# Patient Record
Sex: FEMALE | Race: WHITE | NOT HISPANIC OR LATINO | Employment: FULL TIME | URBAN - METROPOLITAN AREA
[De-identification: names, ages, dates, MRNs, and addresses within clinical notes are randomized per-mention and may not be internally consistent; named-entity substitution may affect disease eponyms.]

---

## 2017-01-01 DIAGNOSIS — F43.89 REACTION TO CHRONIC STRESS: ICD-10-CM

## 2017-01-01 RX ORDER — ALPRAZOLAM 0.25 MG/1
TABLET ORAL
Qty: 10 TABLET | Refills: 0 | Status: CANCELLED | OUTPATIENT
Start: 2017-01-01

## 2017-01-03 ENCOUNTER — OFFICE VISIT (OUTPATIENT)
Dept: INTERNAL MEDICINE | Facility: CLINIC | Age: 57
End: 2017-01-03

## 2017-01-03 VITALS
HEIGHT: 68 IN | WEIGHT: 241.2 LBS | DIASTOLIC BLOOD PRESSURE: 78 MMHG | SYSTOLIC BLOOD PRESSURE: 120 MMHG | HEART RATE: 79 BPM | OXYGEN SATURATION: 98 % | BODY MASS INDEX: 36.56 KG/M2

## 2017-01-03 DIAGNOSIS — R00.1 BRADYCARDIA: ICD-10-CM

## 2017-01-03 DIAGNOSIS — E78.2 MIXED HYPERLIPIDEMIA: ICD-10-CM

## 2017-01-03 DIAGNOSIS — E03.9 ACQUIRED HYPOTHYROIDISM: ICD-10-CM

## 2017-01-03 DIAGNOSIS — Z00.00 ANNUAL PHYSICAL EXAM: Primary | ICD-10-CM

## 2017-01-03 DIAGNOSIS — F43.89 REACTION TO CHRONIC STRESS: ICD-10-CM

## 2017-01-03 DIAGNOSIS — E55.9 VITAMIN D DEFICIENCY: ICD-10-CM

## 2017-01-03 DIAGNOSIS — Z11.59 SCREENING FOR VIRAL DISEASE: ICD-10-CM

## 2017-01-03 DIAGNOSIS — N39.0 URINARY TRACT INFECTION, SITE UNSPECIFIED: ICD-10-CM

## 2017-01-03 LAB
ALBUMIN SERPL-MCNC: 4.5 G/DL (ref 3.2–4.8)
ALBUMIN/GLOB SERPL: 1.6 G/DL (ref 1.5–2.5)
ALP SERPL-CCNC: 56 U/L (ref 25–100)
ALT SERPL W P-5'-P-CCNC: 36 U/L (ref 7–40)
ANION GAP SERPL CALCULATED.3IONS-SCNC: 10 MMOL/L (ref 3–11)
ARTICHOKE IGE QN: 101 MG/DL (ref 0–130)
AST SERPL-CCNC: 23 U/L (ref 0–33)
BILIRUB BLD-MCNC: NEGATIVE MG/DL
BILIRUB SERPL-MCNC: 0.6 MG/DL (ref 0.3–1.2)
BUN BLD-MCNC: 12 MG/DL (ref 9–23)
BUN/CREAT SERPL: 15 (ref 7–25)
CALCIUM SPEC-SCNC: 9.7 MG/DL (ref 8.7–10.4)
CHLORIDE SERPL-SCNC: 102 MMOL/L (ref 99–109)
CHOLEST SERPL-MCNC: 190 MG/DL (ref 0–200)
CLARITY, POC: CLEAR
CO2 SERPL-SCNC: 26 MMOL/L (ref 20–31)
COLOR UR: YELLOW
CREAT BLD-MCNC: 0.8 MG/DL (ref 0.6–1.3)
GFR SERPL CREATININE-BSD FRML MDRD: 74 ML/MIN/1.73
GLOBULIN UR ELPH-MCNC: 2.9 GM/DL
GLUCOSE BLD-MCNC: 99 MG/DL (ref 70–100)
GLUCOSE UR STRIP-MCNC: NEGATIVE MG/DL
HCV AB SER DONR QL: NORMAL
HDLC SERPL-MCNC: 67 MG/DL (ref 40–60)
KETONES UR QL: NEGATIVE
LEUKOCYTE EST, POC: ABNORMAL
NITRITE UR-MCNC: NEGATIVE MG/ML
PH UR: 5 [PH] (ref 5–8)
POTASSIUM BLD-SCNC: 4.2 MMOL/L (ref 3.5–5.5)
PROT SERPL-MCNC: 7.4 G/DL (ref 5.7–8.2)
PROT UR STRIP-MCNC: NEGATIVE MG/DL
RBC # UR STRIP: ABNORMAL /UL
SODIUM BLD-SCNC: 138 MMOL/L (ref 132–146)
SP GR UR: 1.03 (ref 1–1.03)
TRIGL SERPL-MCNC: 134 MG/DL (ref 0–150)
UROBILINOGEN UR QL: NORMAL

## 2017-01-03 PROCEDURE — 86803 HEPATITIS C AB TEST: CPT | Performed by: INTERNAL MEDICINE

## 2017-01-03 PROCEDURE — 80061 LIPID PANEL: CPT | Performed by: INTERNAL MEDICINE

## 2017-01-03 PROCEDURE — 99396 PREV VISIT EST AGE 40-64: CPT | Performed by: INTERNAL MEDICINE

## 2017-01-03 PROCEDURE — 99212 OFFICE O/P EST SF 10 MIN: CPT | Performed by: INTERNAL MEDICINE

## 2017-01-03 PROCEDURE — 81003 URINALYSIS AUTO W/O SCOPE: CPT | Performed by: INTERNAL MEDICINE

## 2017-01-03 PROCEDURE — 87086 URINE CULTURE/COLONY COUNT: CPT | Performed by: INTERNAL MEDICINE

## 2017-01-03 PROCEDURE — 80053 COMPREHEN METABOLIC PANEL: CPT | Performed by: INTERNAL MEDICINE

## 2017-01-03 RX ORDER — ALPRAZOLAM 0.25 MG/1
0.25 TABLET ORAL NIGHTLY PRN
Qty: 90 TABLET | Refills: 1 | Status: SHIPPED | OUTPATIENT
Start: 2017-01-03

## 2017-01-03 RX ORDER — SIMVASTATIN 20 MG
20 TABLET ORAL NIGHTLY
Qty: 90 TABLET | Refills: 1 | Status: SHIPPED | OUTPATIENT
Start: 2017-01-03 | End: 2017-07-20 | Stop reason: SDUPTHER

## 2017-01-03 NOTE — PROGRESS NOTES
Chief Complaint   Patient presents with   • Annual Exam     fasting labs   • Stress   • Hyperlipidemia       History of Present Illness  HM, Adult Female: The patient is being seen for a health maintenance, gynecology and Dr. Weiss - had pap last month . The last health maintenance visit was >1 year(s) ago.   Social History: Household members include spouse. She is . Work status: working full time. The patient has never smoked cigarettes. She reports occasional alcohol use. She has never used illicit drugs.   General Health: The patient's health is described as good. She has regular dental visits. She denies vision problems. She denies hearing loss. Immunizations status: up to date.   Lifestyle:. She consumes a diverse and healthy diet. She does not have any weight concerns. She exercises regularly. She does not use tobacco. She denies alcohol use. She denies drug use.   Reproductive health: the patient is postmenopausal .    Screening: Cancer screening reviewed and current.   Metabolic screening reviewed and current.   Risk screening reviewed and current.   Doing well, had a quiet New Year. Was on vacation for the last week, and today is her first day back.  Stress level is very high, declines any other meds.    Review of Systems   Constitutional: Negative.  Negative for chills and fever.   HENT: Negative for ear discharge, ear pain, sinus pressure and sore throat.    Respiratory: Negative for cough, chest tightness and shortness of breath.    Cardiovascular: Negative for chest pain, palpitations and leg swelling.   Gastrointestinal: Negative for diarrhea, nausea and vomiting.   Musculoskeletal: Negative for arthralgias, back pain and myalgias.   Neurological: Negative for dizziness, syncope and headaches.   Psychiatric/Behavioral: Negative for confusion and sleep disturbance. The patient is nervous/anxious (increased stress).        Patient Active Problem List   Diagnosis   • Elevated blood pressure   •  "Hyperlipidemia   • Hypothyroidism   • Reaction to chronic stress   • Urinary tract infection   • Microscopic hematuria   • Allergic rhinitis   • Anxiety   • Depression   • Postmenopausal atrophic vaginitis   • Vitamin D deficiency       Social History     Social History   • Marital status:      Spouse name: N/A   • Number of children: N/A   • Years of education: N/A     Occupational History   • Not on file.     Social History Main Topics   • Smoking status: Never Smoker   • Smokeless tobacco: Never Used   • Alcohol use Yes   • Drug use: No   • Sexual activity: Not on file     Other Topics Concern   • Not on file     Social History Narrative       Current Outpatient Prescriptions on File Prior to Visit   Medication Sig Dispense Refill   • clidinium-chlordiazepoxide (LIBRAX) 5-2.5 MG per capsule Take 1 capsule by mouth every 6 (six) hours as needed. For cramps and abdominal pain     • docusate sodium (STOOL SOFTENER) 250 MG capsule Take 1 capsule by mouth daily.     • levothyroxine (SYNTHROID, LEVOTHROID) 50 MCG tablet Take 1 tablet by mouth Daily. 90 tablet 0   • VIVELLE-DOT 0.05 MG/24HR patch      • [DISCONTINUED] ALPRAZolam (XANAX) 0.25 MG tablet TAKE ONE TABLET BY MOUTH EVERY NIGHT AT BEDTIME AS NEEDED FOR ANXIETY 10 tablet 0   • [DISCONTINUED] simvastatin (ZOCOR) 20 MG tablet TAKE ONE TABLET BY MOUTH EVERY NIGHT AT BEDTIME 90 tablet 0   • docusate sodium (STOOL SOFTENER) 250 MG capsule Take 1 capsule by mouth Daily. 90 capsule 0     No current facility-administered medications on file prior to visit.        Allergies   Allergen Reactions   • Other        Visit Vitals   • /78   • Pulse 79   • Ht 68\" (172.7 cm)   • Wt 241 lb 3.2 oz (109 kg)   • SpO2 98%  Comment: ra   • BMI 36.67 kg/m2              The following portions of the patient's history were reviewed and updated as appropriate: allergies, current medications, past family history, past medical history, past social history, past surgical history " and problem list.    Physical Exam   Constitutional: She is oriented to person, place, and time. She appears well-developed and well-nourished.   HENT:   Head: Normocephalic and atraumatic.   Right Ear: External ear normal.   Left Ear: External ear normal.   Mouth/Throat: No oropharyngeal exudate.   Eyes: Conjunctivae are normal. Pupils are equal, round, and reactive to light.   Neck: Neck supple. No thyromegaly present.   Cardiovascular: Normal rate, regular rhythm and intact distal pulses.  Exam reveals no gallop and no friction rub.    No murmur heard.  Pulmonary/Chest: Effort normal and breath sounds normal. No respiratory distress. She has no wheezes. She has no rales.   Abdominal: Soft. Bowel sounds are normal. She exhibits no distension. There is no tenderness.   Musculoskeletal: She exhibits no edema.   Neurological: She is alert and oriented to person, place, and time. She displays normal reflexes. No cranial nerve deficit. She exhibits normal muscle tone. Coordination normal.   Skin: Skin is warm and dry.   Psychiatric: She has a normal mood and affect. Judgment normal.   Nursing note and vitals reviewed.      Results for orders placed or performed in visit on 01/03/17   Comprehensive Metabolic Panel   Result Value Ref Range    Glucose 99 70 - 100 mg/dL    BUN 12 9 - 23 mg/dL    Creatinine 0.80 0.60 - 1.30 mg/dL    Sodium 138 132 - 146 mmol/L    Potassium 4.2 3.5 - 5.5 mmol/L    Chloride 102 99 - 109 mmol/L    CO2 26.0 20.0 - 31.0 mmol/L    Calcium 9.7 8.7 - 10.4 mg/dL    Total Protein 7.4 5.7 - 8.2 g/dL    Albumin 4.50 3.20 - 4.80 g/dL    ALT (SGPT) 36 7 - 40 U/L    AST (SGOT) 23 0 - 33 U/L    Alkaline Phosphatase 56 25 - 100 U/L    Total Bilirubin 0.6 0.3 - 1.2 mg/dL    eGFR Non African Amer 74 >60 mL/min/1.73    Globulin 2.9 gm/dL    A/G Ratio 1.6 1.5 - 2.5 g/dL    BUN/Creatinine Ratio 15.0 7.0 - 25.0    Anion Gap 10.0 3.0 - 11.0 mmol/L   Lipid Panel   Result Value Ref Range    Total Cholesterol 190 0 -  200 mg/dL    Triglycerides 134 0 - 150 mg/dL    HDL Cholesterol 67 (H) 40 - 60 mg/dL    LDL Cholesterol  101 0 - 130 mg/dL   Hepatitis C Antibody   Result Value Ref Range    Hepatitis C Ab Non-Reactive Non-Reactive   POC Urinalysis Dipstick, Automated   Result Value Ref Range    Color Yellow Yellow, Straw, Dark Yellow, Emelia    Clarity, UA Clear Clear    Glucose, UA Negative Negative, 1000 mg/dL (3+) mg/dL    Bilirubin Negative Negative    Ketones, UA Negative Negative    Specific Gravity  1.030 1.005 - 1.030    Blood, UA 50 Rohit/ul (A) Negative    pH, Urine 5.0 5.0 - 8.0    Protein, POC Negative Negative mg/dL    Urobilinogen, UA Normal Normal    Leukocytes 500 Lisset/ul (A) Negative    Nitrite, UA Negative Negative       Ernestine was seen today for annual exam, stress and hyperlipidemia.    Diagnoses and all orders for this visit:    Annual physical exam    Bradycardia  -     Holter Monitor - 48 Hour  -     POC Urinalysis Dipstick, Automated    Mixed hyperlipidemia  -     Comprehensive Metabolic Panel  -     Lipid Panel  -     simvastatin (ZOCOR) 20 MG tablet; Take 1 tablet by mouth Every Night.    Acquired hypothyroidism    Vitamin D deficiency    Screening for viral disease  -     Hepatitis C Antibody    Urinary tract infection, site unspecified  -     Urine Culture (Clean Catch)    Reaction to chronic stress  -     ALPRAZolam (XANAX) 0.25 MG tablet; Take 1 tablet by mouth At Night As Needed for anxiety.          Health Maintenance   Topic Date Due   • MAMMOGRAM  10/12/2017   • LIPID PANEL  01/03/2018   • PAP SMEAR  11/21/2019   • COLONOSCOPY  12/22/2025   • TDAP/TD VACCINES (2 - Td) 01/03/2027   • HEPATITIS C SCREENING  Addressed   • INFLUENZA VACCINE  Addressed       Discussion/Summary  Impression: health maintenance visit. Currently, she eats an adequate diet and has an adequate exercise regimen.   Cervical cancer screening:Pap smear is current.last month, per Dr. Weiss.   Breast cancer screening: mammogram is due,  plans to get it soon.   Colorectal cancer screening: colonoscopy is current, due 2025.  Osteoporosis screening: Bone mineral density test is UTD- per Dr. Weiss.   Screening lab work includes hemoglobin, glucose, lipid profile, thyroid function testing, 25-hydroxyvitamin D and urinalysis.   Immunizations are needed, declines flu shot.     The patient has read and signed the [facility] Controlled Substance Contract.  I will continue to see patient for regular follow up appointments.  They are well controlled on their medication.  JOHANNY has been reviewed by me and is updated every 3 months. The patient is aware of the potential for addiction and dependence.    Return in about 6 months (around 7/3/2017) for Next scheduled follow up, please schedule end of June..

## 2017-01-03 NOTE — MR AVS SNAPSHOT
Ernestine Rodgers   1/3/2017 9:15 AM   Office Visit    Dept Phone:  [phone]   Encounter #:  [id number]    Provider:  Franci Vasquez MD   Department:  [facility] INTERNAL MEDICINE AND ENDOCRINOLOGY [place]                Your Full Care Plan              Today's Medication Changes          These changes are accurate as of: 1/3/17 11:15 AM.  If you have any questions, ask your nurse or doctor.               Medication(s)that have changed:     ALPRAZolam 0.25 MG tablet   Commonly known as:  XANAX   Take 1 tablet by mouth At Night As Needed for anxiety.   What changed:  See the new instructions.       simvastatin 20 MG tablet   Commonly known as:  ZOCOR   Take 1 tablet by mouth Every Night.   What changed:  See the new instructions.            Where to Get Your Medications      These medications were sent to [facility] [address] [place] [address] [address] [facility] - [phone] [facility] [phone]   [address] [facility] [address]     Phone:  [phone]     simvastatin 20 MG tablet         You can get these medications from any pharmacy     Bring a paper prescription for each of these medications     ALPRAZolam 0.25 MG tablet                  Your Updated Medication List          This list is accurate as of: 1/3/17 11:15 AM.  Always use your most recent med list.                ALPRAZolam 0.25 MG tablet   Commonly known as:  XANAX   Take 1 tablet by mouth At Night As Needed for anxiety.       clidinium-chlordiazePOXIDE 5-2.5 MG per capsule   Commonly known as:  LIBRAX       levothyroxine 50 MCG tablet   Commonly known as:  SYNTHROID, LEVOTHROID   Take 1 tablet by mouth Daily.       simvastatin 20 MG tablet   Commonly known as:  ZOCOR   Take 1 tablet by mouth Every Night.       * STOOL SOFTENER 250 MG capsule   Generic drug:  docusate sodium       * docusate sodium 250 MG capsule   Commonly known as:  STOOL SOFTENER   Take 1 capsule by  mouth Daily.       VIVELLE-DOT 0.05 MG/24HR patch   Generic drug:  estradiol       * Notice:  This list has 2 medication(s) that are the same as other medications prescribed for you. Read the directions carefully, and ask your doctor or other care provider to review them with you.            We Performed the Following     Comprehensive Metabolic Panel     Hepatitis C Antibody     Holter Monitor - 48 Hour     Lipid Panel     POC Urinalysis Dipstick, Automated     Urine Culture (Clean Catch)       You Were Diagnosed With        Codes Comments    Annual physical exam    -  Primary ICD-10-CM: Z00.00  ICD-9-CM: V70.0     Bradycardia     ICD-10-CM: R00.1  ICD-9-CM: 427.89     Mixed hyperlipidemia     ICD-10-CM: E78.2  ICD-9-CM: 272.2     Acquired hypothyroidism     ICD-10-CM: E03.9  ICD-9-CM: 244.9     Vitamin D deficiency     ICD-10-CM: E55.9  ICD-9-CM: 268.9     Screening for viral disease     ICD-10-CM: Z11.59  ICD-9-CM: V73.99     Urinary tract infection, site unspecified     ICD-10-CM: N39.0     Reaction to chronic stress     ICD-10-CM: F43.8  ICD-9-CM: 309.9       Instructions     None    Patient Instructions History      Upcoming Appointments     Visit Type Date Time Department    PHYSICAL 1/3/2017  9:15 AM [facility] [employer]    FOLLOW UP 6/27/2017 11:30 AM [facility] [place] Signup     Our records indicate that you have an active [employer] account.    You can view your After Visit Summary by going to [facility] and logging in with your [employer] username and password.  If you don't have a [employer] username and password but a parent or guardian has access to your record, the parent or guardian should login with their own [employer] username and password and access your record to view the After Visit Summary.    If you have questions, you can email [email or web address] or call [phone] to talk to our [employer] staff.  Remember, [employer] is NOT to be used for urgent needs.  For  "medical emergencies, dial 911.               Other Info from Your Visit           Your Appointments     Jun 27, 2017 11:30 AM EDT   Follow Up with Franci Vasquez MD   [facility] [place] (--)    [address] [address] [phone]           Arrive 15 minutes prior to appointment.              Allergies     Other        Reason for Visit     Annual Exam fasting labs    Stress     Hyperlipidemia           Vital Signs     Blood Pressure Pulse Height Weight Oxygen Saturation Body Mass Index    120/78 79 68\" (172.7 cm) 241 lb 3.2 oz (109 kg) 98% 36.67 kg/m2    Smoking Status                   Never Smoker           Problems and Diagnoses Noted     High cholesterol or triglycerides    Underactive thyroid    Reaction to chronic stress    Urinary tract infection    Vitamin D deficiency    Annual physical exam    -  Primary    Bradycardia        Screening for viral disease          Results     Holter Monitor - 48 Hour           POC Urinalysis Dipstick, Automated      Component Value Standard Range & Units    Color Yellow Yellow, Straw, Dark Yellow, Emelia    Clarity, UA Clear Clear    Glucose, UA Negative Negative, 1000 mg/dL (3+) mg/dL    Bilirubin Negative Negative    Ketones, UA Negative Negative    Specific Gravity  1.030 1.005 - 1.030    Blood, UA 50 Rohit/ul Negative    pH, Urine 5.0 5.0 - 8.0    Protein, POC Negative Negative mg/dL    Urobilinogen, UA Normal Normal    Leukocytes 500 Lisset/ul Negative    Nitrite, UA Negative Negative                    "

## 2017-01-05 ENCOUNTER — PATIENT MESSAGE (OUTPATIENT)
Dept: INTERNAL MEDICINE | Facility: CLINIC | Age: 57
End: 2017-01-05

## 2017-01-05 LAB — BACTERIA SPEC AEROBE CULT: NORMAL

## 2017-01-06 DIAGNOSIS — F43.89 REACTION TO CHRONIC STRESS: ICD-10-CM

## 2017-01-06 RX ORDER — ALPRAZOLAM 0.25 MG/1
TABLET ORAL
Qty: 10 TABLET | Refills: 0 | OUTPATIENT
Start: 2017-01-06

## 2017-01-09 RX ORDER — CIPROFLOXACIN 500 MG/1
500 TABLET, FILM COATED ORAL 2 TIMES DAILY
Qty: 14 TABLET | Refills: 0 | Status: SHIPPED | OUTPATIENT
Start: 2017-01-09

## 2017-01-09 NOTE — TELEPHONE ENCOUNTER
Jayla Pleitez MA 1/9/2017 8:50 AM EST        ----- Message -----   From: Ernestine Rodgers   Sent: 1/7/2017 7:01 PM   To: Luis Mckee [facility]  Subject: IT'S GOTTEN WORSE really uncomfortable - [place]*    Hi again.     So it's gotten worse. It feels like I have to go every minute not 30 minutes. Was 30 minutes yesterday but since I sent you the last message which is probably less than a few minutes ago, I have had to go to the bathroom with nothing coming out of course three times. In fact, I am on the toilet as I type this to you. Something is going on and it is quite uncomfortable.  ----- Message -----  From: Franci Vasquez MD  Sent: 1/6/2017 2:50 PM EST  To: Ernestine Rodgers  Subject: RE: Test Results Question    Hi Ernestine,    That was in the urine dipstick that we do in office. It detected white cells, , hence the culture was sent.   But it looks like that may be normal skin cells, and not an infection , since culture just grew normal skin bacteria.    Franci    ----- Message -----   From: Ernestine Rodgers   Sent: 1/5/2017 4:02 PM EST   To: Franci Vasquez MD  Subject: RE: Test Results Question    Hi.     Curious about the high white blood cells that you were seeing it was that an old report?    Thank you so much. You are the best.     Carlene  ----- Message -----  From: Franci Vasquez MD  Sent: 1/5/2017 1:37 PM EST  To: Ernestine Rodgers  Subject: RE: Test Results Question    Urine culture is normal.So no need to treat.   All your other labs look good also.  I will send you a copy of the report.  Thank for checking on it.  Happy New Year !    Franci    ----- Message -----   From: Ernestine Rodgers   Sent: 1/5/2017 7:29 AM EST   To: Franci Vasquez MD  Subject: Test Results Question    Any information about the Urine culture? White blood cells. UTI. How we should treat?

## 2017-03-07 RX ORDER — CHLORDIAZEPOXIDE HYDROCHLORIDE AND CLIDINIUM BROMIDE 5; 2.5 MG/1; MG/1
CAPSULE ORAL
Qty: 30 CAPSULE | Refills: 3 | Status: SHIPPED | OUTPATIENT
Start: 2017-03-07

## 2017-03-16 RX ORDER — LEVOTHYROXINE SODIUM 0.05 MG/1
TABLET ORAL
Qty: 90 TABLET | Refills: 1 | Status: SHIPPED | OUTPATIENT
Start: 2017-03-16 | End: 2017-10-04 | Stop reason: SDUPTHER

## 2017-03-21 ENCOUNTER — TELEPHONE (OUTPATIENT)
Dept: INTERNAL MEDICINE | Facility: CLINIC | Age: 57
End: 2017-03-21

## 2017-03-21 ENCOUNTER — OFFICE VISIT (OUTPATIENT)
Dept: INTERNAL MEDICINE | Facility: CLINIC | Age: 57
End: 2017-03-21

## 2017-03-21 VITALS
SYSTOLIC BLOOD PRESSURE: 136 MMHG | WEIGHT: 231 LBS | OXYGEN SATURATION: 99 % | BODY MASS INDEX: 35.01 KG/M2 | HEIGHT: 68 IN | DIASTOLIC BLOOD PRESSURE: 82 MMHG | HEART RATE: 73 BPM

## 2017-03-21 DIAGNOSIS — M25.552 LEFT HIP PAIN: Primary | ICD-10-CM

## 2017-03-21 DIAGNOSIS — R10.84 GENERALIZED ABDOMINAL DISCOMFORT: ICD-10-CM

## 2017-03-21 LAB
BILIRUB BLD-MCNC: NEGATIVE MG/DL
CLARITY, POC: CLEAR
COLOR UR: YELLOW
GLUCOSE UR STRIP-MCNC: NEGATIVE MG/DL
KETONES UR QL: NEGATIVE
LEUKOCYTE EST, POC: NEGATIVE
NITRITE UR-MCNC: NEGATIVE MG/ML
PH UR: 6 [PH] (ref 5–8)
PROT UR STRIP-MCNC: NEGATIVE MG/DL
RBC # UR STRIP: NEGATIVE /UL
SP GR UR: 1.03 (ref 1–1.03)
UROBILINOGEN UR QL: NORMAL

## 2017-03-21 PROCEDURE — 81003 URINALYSIS AUTO W/O SCOPE: CPT | Performed by: INTERNAL MEDICINE

## 2017-03-21 PROCEDURE — 86255 FLUORESCENT ANTIBODY SCREEN: CPT | Performed by: INTERNAL MEDICINE

## 2017-03-21 PROCEDURE — 99213 OFFICE O/P EST LOW 20 MIN: CPT | Performed by: INTERNAL MEDICINE

## 2017-03-21 NOTE — TELEPHONE ENCOUNTER
Ok for patient to come in a leave a urine sample or does she need an Appointment?    ----- Message from Henny Garcia sent at 3/21/2017 10:15 AM EDT -----  Contact: PATIENT  PATIENT IS CALLING WANTING TO BE SEEN TODAY FOR UTI CONCERNS. SHE IS WANTING TO COME IN A LEAVE A SAMPLE TO SEE IF SHE DOES HAVE A UTI. SHE IS HAVING PAIN IN LOWER BACK SIDE AND STOMACH IS BLOATED AT NIGHT. THE PAIN HAS BEEN EXCRUCIATING AND HAS A HISTORY OF KIDNEY INFECTIONS. SHE WANTS US TO CALL HER BACK ABOUT LEAVING A URINE SAMPLE TO BE TESTED FOR UTI OR KIDNEY INFECTION. YOU CAN REACH HER BACK [phone]

## 2017-03-21 NOTE — PROGRESS NOTES
Back Pain (low back)    Subjective   Ernestine Rodgers is a 56 y.o. female is here today for follow-up.    History of Present Illness   Carlene reports that she is planning to relocate to [place].  Having several aches and pains , yesterday has severe left flank pain posteriorly, almost went to the er, took aleve and gradually helped.  Also had rt. Shoulder pain which is better today.  Also bloated, and having some persistent rt inguinal pain.    Current Outpatient Prescriptions:   •  ALPRAZolam (XANAX) 0.25 MG tablet, Take 1 tablet by mouth At Night As Needed for anxiety., Disp: 90 tablet, Rfl: 1  •  ciprofloxacin (CIPRO) 500 MG tablet, Take 1 tablet by mouth 2 (Two) Times a Day., Disp: 14 tablet, Rfl: 0  •  clidinium-chlordiazePOXIDE (LIBRAX) 5-2.5 MG per capsule, TAKE ONE CAPSULE BY MOUTH DAILY AS NEEDED, Disp: 30 capsule, Rfl: 3  •  docusate sodium (STOOL SOFTENER) 250 MG capsule, Take 1 capsule by mouth daily., Disp: , Rfl:   •  levothyroxine (SYNTHROID, LEVOTHROID) 50 MCG tablet, TAKE ONE TABLET BY MOUTH DAILY, Disp: 90 tablet, Rfl: 1  •  simvastatin (ZOCOR) 20 MG tablet, Take 1 tablet by mouth Every Night., Disp: 90 tablet, Rfl: 1  •  STOOL SOFTENER 250 MG capsule, TAKE ONE CAPSULE BY MOUTH DAILY, Disp: 90 capsule, Rfl: 1  •  VIVELLE-DOT 0.05 MG/24HR patch, , Disp: , Rfl:       The following portions of the patient's history were reviewed and updated as appropriate: allergies, current medications, past family history, past medical history, past social history, past surgical history and problem list.    Review of Systems   Constitutional: Positive for fatigue. Negative for chills.   HENT: Negative for ear discharge, ear pain, sinus pressure and sore throat.    Respiratory: Negative for cough, chest tightness and shortness of breath.    Cardiovascular: Negative for chest pain, palpitations and leg swelling.   Gastrointestinal: Positive for abdominal distention and constipation. Negative for diarrhea, nausea and  "vomiting.   Genitourinary: Positive for dysuria and frequency. Negative for urgency.   Musculoskeletal: Positive for arthralgias and myalgias. Negative for back pain.   Neurological: Negative for dizziness, syncope and headaches.   Psychiatric/Behavioral: Negative for confusion and sleep disturbance.       Objective   Visit Vitals   • /82   • Pulse 73   • Ht 68\" (172.7 cm)   • Wt 231 lb (105 kg)   • SpO2 99%  Comment: ra   • BMI 35.12 kg/m2     Physical Exam   Constitutional: She is oriented to person, place, and time. She appears well-developed and well-nourished.   HENT:   Head: Normocephalic and atraumatic.   Right Ear: External ear normal.   Left Ear: External ear normal.   Mouth/Throat: No oropharyngeal exudate.   Eyes: Conjunctivae are normal. Pupils are equal, round, and reactive to light.   Neck: Neck supple. No thyromegaly present.   Cardiovascular: Normal rate and regular rhythm.    Pulmonary/Chest: Effort normal and breath sounds normal.   Abdominal: Soft. Bowel sounds are normal. She exhibits distension. There is tenderness (mild diffuse tenderness).   Musculoskeletal: She exhibits tenderness (over rt posterior hip). She exhibits no edema.   Neurological: She is alert and oriented to person, place, and time. No cranial nerve deficit.   Skin: Skin is warm and dry.   Psychiatric: She has a normal mood and affect. Judgment normal.   Nursing note and vitals reviewed.        Results for orders placed or performed in visit on 03/21/17   POCT urinalysis dipstick, automated   Result Value Ref Range    Color Yellow Yellow, Straw, Dark Yellow, Emelia    Clarity, UA Clear Clear    Glucose, UA Negative Negative, 1000 mg/dL (3+) mg/dL    Bilirubin Negative Negative    Ketones, UA Negative Negative    Specific Gravity  1.030 1.005 - 1.030    Blood, UA Negative Negative    pH, Urine 6.0 5.0 - 8.0    Protein, POC Negative Negative mg/dL    Urobilinogen, UA Normal Normal    Leukocytes Negative Negative    Nitrite, " UA Negative Negative             Assessment/Plan   Diagnoses and all orders for this visit:    Left hip pain  Comments:  likely bursitis, continue aleve, declines toradol shot. Ice and topical biofreeze. CINB, will need PT.  Orders:  -     POCT urinalysis dipstick, automated    Generalized abdominal discomfort  -     Celiac Disease Panel      UA negative. Adv. To try avoiding dairy if celiac test -ve.           Return for Next scheduled follow up.

## 2017-03-23 LAB
ENDOMYSIUM IGA SER QL: NEGATIVE
IGA SERPL-MCNC: 174 MG/DL (ref 87–352)
TTG IGA SER-ACNC: <2 U/ML (ref 0–3)

## 2017-07-01 DIAGNOSIS — F43.89 REACTION TO CHRONIC STRESS: ICD-10-CM

## 2017-07-03 NOTE — TELEPHONE ENCOUNTER
Pt. Moved to [place].  Please check if he has a LMD there?  OTW can she come in at her next visit to [place] for the Rx?    thanks

## 2017-07-10 RX ORDER — ALPRAZOLAM 0.25 MG/1
TABLET ORAL
Qty: 30 TABLET | Refills: 0 | OUTPATIENT
Start: 2017-07-10

## 2017-07-20 DIAGNOSIS — E78.2 MIXED HYPERLIPIDEMIA: ICD-10-CM

## 2017-07-20 RX ORDER — SIMVASTATIN 20 MG
TABLET ORAL
Qty: 30 TABLET | Refills: 2 | Status: SHIPPED | OUTPATIENT
Start: 2017-07-20

## 2017-09-25 ENCOUNTER — HOSPITAL ENCOUNTER (OUTPATIENT)
Dept: MAMMOGRAPHY | Facility: HOSPITAL | Age: 57
Discharge: HOME | End: 2017-09-25
Attending: OBSTETRICS & GYNECOLOGY | Admitting: OBSTETRICS & GYNECOLOGY

## 2017-09-25 DIAGNOSIS — Z12.31 VISIT FOR SCREENING MAMMOGRAM: ICD-10-CM

## 2017-09-25 PROCEDURE — 77063 BREAST TOMOSYNTHESIS BI: CPT

## 2017-09-25 PROCEDURE — 77063 BREAST TOMOSYNTHESIS BI: CPT | Performed by: RADIOLOGY

## 2017-09-25 PROCEDURE — 77067 SCR MAMMO BI INCL CAD: CPT | Performed by: RADIOLOGY

## 2017-09-25 PROCEDURE — G0202 SCR MAMMO BI INCL CAD: HCPCS

## 2017-10-04 RX ORDER — LEVOTHYROXINE SODIUM 0.05 MG/1
TABLET ORAL
Qty: 30 TABLET | Refills: 0 | Status: SHIPPED | OUTPATIENT
Start: 2017-10-04 | End: 2017-11-02 | Stop reason: SDUPTHER

## 2017-10-04 NOTE — TELEPHONE ENCOUNTER
E-scribed Levothyroxine 50 mcg tablet Take one tab po daily #30 R: 2. Sent to [facility] [address]. Called pt to inform of rx being sent in. Pt verbalized understanding and appreciation.

## 2017-11-02 RX ORDER — LEVOTHYROXINE SODIUM 0.05 MG/1
TABLET ORAL
Qty: 30 TABLET | Refills: 2 | Status: SHIPPED | OUTPATIENT
Start: 2017-11-02